# Patient Record
Sex: MALE | Race: WHITE
[De-identification: names, ages, dates, MRNs, and addresses within clinical notes are randomized per-mention and may not be internally consistent; named-entity substitution may affect disease eponyms.]

---

## 2019-06-13 ENCOUNTER — HOSPITAL ENCOUNTER (EMERGENCY)
Dept: HOSPITAL 54 - ER | Age: 39
LOS: 1 days | Discharge: HOME | End: 2019-06-14
Payer: MEDICAID

## 2019-06-13 VITALS — WEIGHT: 136 LBS | BODY MASS INDEX: 21.35 KG/M2 | HEIGHT: 67 IN

## 2019-06-13 DIAGNOSIS — F19.10: ICD-10-CM

## 2019-06-13 DIAGNOSIS — F15.10: ICD-10-CM

## 2019-06-13 DIAGNOSIS — F20.9: ICD-10-CM

## 2019-06-13 DIAGNOSIS — J45.909: ICD-10-CM

## 2019-06-13 DIAGNOSIS — R41.82: ICD-10-CM

## 2019-06-13 DIAGNOSIS — Z91.19: ICD-10-CM

## 2019-06-13 DIAGNOSIS — F10.10: ICD-10-CM

## 2019-06-13 DIAGNOSIS — A64: ICD-10-CM

## 2019-06-13 DIAGNOSIS — R45.1: ICD-10-CM

## 2019-06-13 DIAGNOSIS — Z59.0: ICD-10-CM

## 2019-06-13 DIAGNOSIS — F29: Primary | ICD-10-CM
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
## 2019-06-13 DIAGNOSIS — F31.9: ICD-10-CM

## 2019-06-13 LAB
ALBUMIN SERPL BCP-MCNC: 3.7 G/DL (ref 3.4–5)
ALP SERPL-CCNC: 120 U/L (ref 46–116)
ALT SERPL W P-5'-P-CCNC: 25 U/L (ref 12–78)
APAP SERPL-MCNC: < 2 UG/ML (ref 10–30)
AST SERPL W P-5'-P-CCNC: 29 U/L (ref 15–37)
BASOPHILS # BLD AUTO: 0.1 /CMM (ref 0–0.2)
BASOPHILS NFR BLD AUTO: 0.5 % (ref 0–2)
BILIRUB DIRECT SERPL-MCNC: 0.1 MG/DL (ref 0–0.2)
BILIRUB SERPL-MCNC: 0.5 MG/DL (ref 0.2–1)
BUN SERPL-MCNC: 18 MG/DL (ref 7–18)
CALCIUM SERPL-MCNC: 9.2 MG/DL (ref 8.5–10.1)
CHLORIDE SERPL-SCNC: 103 MMOL/L (ref 98–107)
CO2 SERPL-SCNC: 24 MMOL/L (ref 21–32)
CREAT SERPL-MCNC: 1 MG/DL (ref 0.6–1.3)
EOSINOPHIL NFR BLD AUTO: 0.6 % (ref 0–6)
ETHANOL SERPL-MCNC: < 3 MG/DL (ref 0–0)
GLUCOSE SERPL-MCNC: 85 MG/DL (ref 74–106)
HCT VFR BLD AUTO: 48 % (ref 39–51)
HGB BLD-MCNC: 16.4 G/DL (ref 13.5–17.5)
KETONES UR STRIP-MCNC: 15 MG/DL
LYMPHOCYTES NFR BLD AUTO: 1.8 /CMM (ref 0.8–4.8)
LYMPHOCYTES NFR BLD AUTO: 16.1 % (ref 20–44)
MCHC RBC AUTO-ENTMCNC: 34 G/DL (ref 31–36)
MCV RBC AUTO: 97 FL (ref 80–96)
MONOCYTES NFR BLD AUTO: 1 /CMM (ref 0.1–1.3)
MONOCYTES NFR BLD AUTO: 8.6 % (ref 2–12)
NEUTROPHILS # BLD AUTO: 8.2 /CMM (ref 1.8–8.9)
NEUTROPHILS NFR BLD AUTO: 74.2 % (ref 43–81)
PH UR STRIP: 6 [PH] (ref 5–8)
PLATELET # BLD AUTO: 259 /CMM (ref 150–450)
POTASSIUM SERPL-SCNC: 4.5 MMOL/L (ref 3.5–5.1)
PROT SERPL-MCNC: 7.3 G/DL (ref 6.4–8.2)
RBC # BLD AUTO: 4.92 MIL/UL (ref 4.5–6)
RBC #/AREA URNS HPF: (no result) /HPF (ref 0–2)
SALICYLATES SERPL-MCNC: 4.5 MG/DL (ref 2.8–20)
SODIUM SERPL-SCNC: 139 MMOL/L (ref 136–145)
UROBILINOGEN UR STRIP-MCNC: 0.2 EU/DL
WBC #/AREA URNS HPF: (no result) /HPF
WBC #/AREA URNS HPF: (no result) /HPF (ref 0–3)
WBC NRBC COR # BLD AUTO: 11 K/UL (ref 4.3–11)

## 2019-06-13 PROCEDURE — G0480 DRUG TEST DEF 1-7 CLASSES: HCPCS

## 2019-06-13 PROCEDURE — 80307 DRUG TEST PRSMV CHEM ANLYZR: CPT

## 2019-06-13 PROCEDURE — 80329 ANALGESICS NON-OPIOID 1 OR 2: CPT

## 2019-06-13 PROCEDURE — 81001 URINALYSIS AUTO W/SCOPE: CPT

## 2019-06-13 PROCEDURE — 85025 COMPLETE CBC W/AUTO DIFF WBC: CPT

## 2019-06-13 PROCEDURE — 99284 EMERGENCY DEPT VISIT MOD MDM: CPT

## 2019-06-13 PROCEDURE — 80048 BASIC METABOLIC PNL TOTAL CA: CPT

## 2019-06-13 PROCEDURE — 87491 CHLMYD TRACH DNA AMP PROBE: CPT

## 2019-06-13 PROCEDURE — 87591 N.GONORRHOEAE DNA AMP PROB: CPT

## 2019-06-13 PROCEDURE — 80305 DRUG TEST PRSMV DIR OPT OBS: CPT

## 2019-06-13 PROCEDURE — 87086 URINE CULTURE/COLONY COUNT: CPT

## 2019-06-13 PROCEDURE — 36415 COLL VENOUS BLD VENIPUNCTURE: CPT

## 2019-06-13 PROCEDURE — 80076 HEPATIC FUNCTION PANEL: CPT

## 2019-06-13 PROCEDURE — 96372 THER/PROPH/DIAG INJ SC/IM: CPT

## 2019-06-13 SDOH — ECONOMIC STABILITY - HOUSING INSECURITY: HOMELESSNESS: Z59.0

## 2019-06-13 NOTE — NUR
pt to er bb ra for auditory hallucinations s/p using meth. No immediate signs 
of distress noted. pt vital signs stable. Pt a/ox3. Pt denies si/hi. pt to er 
bed, changed into gown and connected to monitor. will continue to monitor pt.

## 2019-06-14 ENCOUNTER — HOSPITAL ENCOUNTER (EMERGENCY)
Dept: HOSPITAL 54 - ER | Age: 39
LOS: 1 days | Discharge: HOME | End: 2019-06-15
Payer: MEDICAID

## 2019-06-14 VITALS — BODY MASS INDEX: 21.35 KG/M2 | HEIGHT: 67 IN | WEIGHT: 136 LBS

## 2019-06-14 VITALS — SYSTOLIC BLOOD PRESSURE: 124 MMHG | DIASTOLIC BLOOD PRESSURE: 89 MMHG
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
## 2019-06-14 DIAGNOSIS — R45.851: Primary | ICD-10-CM

## 2019-06-14 DIAGNOSIS — F10.10: ICD-10-CM

## 2019-06-14 DIAGNOSIS — F15.10: ICD-10-CM

## 2019-06-14 DIAGNOSIS — R44.0: ICD-10-CM

## 2019-06-14 DIAGNOSIS — J45.909: ICD-10-CM

## 2019-06-14 DIAGNOSIS — F31.9: ICD-10-CM

## 2019-06-14 PROCEDURE — 99284 EMERGENCY DEPT VISIT MOD MDM: CPT

## 2019-06-14 PROCEDURE — 96372 THER/PROPH/DIAG INJ SC/IM: CPT

## 2019-06-14 NOTE — NUR
PT IN BED ASLEEP, EASILY AROUSED BY VOICE. HOOKED TO MONITOR. KEPT SAFE AND 
WARM. WILL CONTINUE TO MONITOR

## 2019-06-14 NOTE — NUR
PT BIBSELF C/C AUDITORY HALLUCINATIONS, +SI, NO PLAN, -HI, DENIES PAIN. PT 
AOX3. NAD NOTED. RESP EVEN AND UNLABORED. PT IN BED 7. WILL CONTINUE TO 
MONITOR.

## 2019-06-14 NOTE — NUR
PT IN BED ASLEEP, TUCKED IN BLANKET. EASILY AROUSED BY VOICE. HOOKED TO 
MONITOR. KEPT SAFE AND WARM. WILL CONTINUE TO MONITOR

## 2019-06-14 NOTE — NUR
PT OK TO BE DISCHARGED PER DR BOX. Patient discharged to home in stable 
condition. Written and verbal after care instructions given. Patient upset upon 
discharge and refuses to sign discharge paper work and homeless waiver. Pt left 
er.

## 2019-06-15 VITALS — SYSTOLIC BLOOD PRESSURE: 135 MMHG | DIASTOLIC BLOOD PRESSURE: 89 MMHG

## 2019-06-15 NOTE — NUR
Pt ok to be discharged home. Pt cursing at staff upon discharge. Security at 
bedside to escort patient.

## 2019-06-15 NOTE — NUR
[place]ING [place]. NO SIGNS OF DISTRESS NOTED. PT VITAL SIGNS STABLE. PT 
EASILY AROUSABLE. WILL CONT TO MONIOT PT.

## 2023-10-19 ENCOUNTER — HOSPITAL ENCOUNTER (EMERGENCY)
Dept: HOSPITAL 87 - ER | Age: 43
Discharge: HOME | End: 2023-10-19
Payer: MEDICAID

## 2023-10-19 VITALS
SYSTOLIC BLOOD PRESSURE: 149 MMHG | DIASTOLIC BLOOD PRESSURE: 89 MMHG | HEART RATE: 98 BPM | RESPIRATION RATE: 18 BRPM | TEMPERATURE: 98.6 F

## 2023-10-19 VITALS — BODY MASS INDEX: 23.56 KG/M2 | HEIGHT: 68 IN | WEIGHT: 155.43 LBS

## 2023-10-19 VITALS — OXYGEN SATURATION: 100 %

## 2023-10-19 DIAGNOSIS — R10.9: Primary | ICD-10-CM

## 2023-10-19 DIAGNOSIS — J45.909: ICD-10-CM

## 2023-10-19 DIAGNOSIS — I10: ICD-10-CM

## 2023-10-19 DIAGNOSIS — Z88.0: ICD-10-CM

## 2023-10-19 LAB
ALBUMIN SERPL BCP-MCNC: 3.9 G/DL (ref 3.4–5)
ALT SERPL W P-5'-P-CCNC: 19 IU/L (ref 13–61)
APPEARANCE UR: (no result)
AST SERPL W P-5'-P-CCNC: 15 IU/L (ref 15–37)
BACTERIA #/AREA URNS AUTO: (no result) /[HPF]
BASOPHILS NFR BLD AUTO: 0.9 % (ref 0–2)
BILIRUB SERPL-MCNC: 0.3 MG/DL (ref 0.1–1)
BUN SERPL-MCNC: 14 MG/DL (ref 7–21)
CALCIUM SERPL-MCNC: 9.4 MG/DL (ref 8.5–10.1)
CHLORIDE SERPL-SCNC: 100 MEQ/L (ref 98–107)
CO2 SERPL-SCNC: 29 MEQ/L (ref 21–32)
COLOR UR: YELLOW
CREAT SERPL-MCNC: 1 MG/DL (ref 0.6–1.3)
CRYSTALS UR QL AUTO: (no result) /LPF
EOSINOPHIL NFR BLD AUTO: 1.2 % (ref 0–5)
ERYTHROCYTE [DISTWIDTH] IN BLOOD BY AUTOMATED COUNT: 17 % (ref 11.6–14.6)
GLUCOSE SERPL-MCNC: 106 MG/DL (ref 70–105)
GLUCOSE UR STRIP.AUTO-MCNC: NEGATIVE MG/DL
HCT VFR BLD AUTO: 39.5 % (ref 42–52)
HEMOLYSIS INTERF INDEX SERPL-ACNC: 1 (ref 1–3)
HGB BLD-MCNC: 13.5 G/DL (ref 14–18)
HGB UR QL STRIP: NEGATIVE
ICTERIC INTERF INDEX SERPL-ACNC: 1 (ref 1–4)
KETONES UR STRIP-MCNC: NEGATIVE MG/DL
LEUKOCYTE ESTERASE UR QL STRIP: NEGATIVE
LIPEMIC INTERF INDEX SERPL-ACNC: 1 (ref 1–3)
LYMPHOCYTES NFR BLD AUTO: 11.7 % (ref 20–50)
MCH RBC QN AUTO: 30.6 PG (ref 28–32)
MCHC RBC AUTO-ENTMCNC: 34.1 G/DL (ref 31–37)
MCV RBC AUTO: 89.8 FL (ref 80–94)
MONOCYTES NFR BLD AUTO: 8.4 % (ref 2–8)
NEUTROPHILS NFR BLD AUTO: 77.8 % (ref 40–76)
NITRITE UR QL STRIP: NEGATIVE
PH UR STRIP: 7.5 [PH] (ref 4.5–8)
PLATELET # BLD AUTO: 379 X1000/UL (ref 130–400)
PMV BLD AUTO: 6.3 FL (ref 7.4–10.4)
POTASSIUM SERPL-SCNC: 4 MEQ/L (ref 3.5–5.1)
PROT SERPL-MCNC: 7.7 G/DL (ref 6–8.3)
PROT UR QL STRIP: NEGATIVE
RBC # BLD AUTO: 4.4 MILL/UL (ref 4.7–6.1)
RBC #/AREA URNS AUTO: (no result) /HPF (ref 0–2)
SODIUM SERPL-SCNC: 135 MEQ/L (ref 136–145)
SP GR UR STRIP: 1.02 (ref 1–1.03)
SQUAMOUS #/AREA URNS AUTO: (no result) /LPF
UROBILINOGEN UR STRIP-MCNC: 0.2 E.U./DL (ref 0.2–1)
WBC # BLD: 11.8 X1000/UL (ref 4.5–11)
WBC #/AREA URNS AUTO: (no result) /HPF (ref 0–2)
YEAST UR QL AUTO: (no result)

## 2023-10-19 PROCEDURE — 80053 COMPREHEN METABOLIC PANEL: CPT

## 2023-10-19 PROCEDURE — 96374 THER/PROPH/DIAG INJ IV PUSH: CPT

## 2023-10-19 PROCEDURE — 85025 COMPLETE CBC W/AUTO DIFF WBC: CPT

## 2023-10-19 PROCEDURE — 96361 HYDRATE IV INFUSION ADD-ON: CPT

## 2023-10-19 PROCEDURE — 81003 URINALYSIS AUTO W/O SCOPE: CPT

## 2023-10-19 PROCEDURE — 99285 EMERGENCY DEPT VISIT HI MDM: CPT

## 2023-10-19 PROCEDURE — 74177 CT ABD & PELVIS W/CONTRAST: CPT

## 2023-10-19 PROCEDURE — 36415 COLL VENOUS BLD VENIPUNCTURE: CPT
